# Patient Record
Sex: MALE | Race: BLACK OR AFRICAN AMERICAN | URBAN - METROPOLITAN AREA
[De-identification: names, ages, dates, MRNs, and addresses within clinical notes are randomized per-mention and may not be internally consistent; named-entity substitution may affect disease eponyms.]

---

## 2017-08-18 ENCOUNTER — IMPORTED ENCOUNTER (OUTPATIENT)
Dept: URBAN - METROPOLITAN AREA CLINIC 50 | Facility: CLINIC | Age: 47
End: 2017-08-18

## 2017-11-17 ENCOUNTER — IMPORTED ENCOUNTER (OUTPATIENT)
Dept: URBAN - METROPOLITAN AREA CLINIC 50 | Facility: CLINIC | Age: 47
End: 2017-11-17

## 2020-08-31 NOTE — PATIENT DISCUSSION
DISCUSSED WITH PT THAT IT HAS BEEN REPORTED THAT PATIENTS WITH HISTORY OF DEPRESSION OR MENTAL ILLNESS FOLLOWING ELECTIVE PROCEDURES, SUCH AS PLASTIC SURGERY OR LASER VISION CORRECTION CAN ADVERSELY EFFECT THEIR QUALITY OF LIFE, REGARDLESS WHETHER OUTCOME IS GOOD OR SUB-OPTIMAL PT ADVISED OF THE ALTERNATIVES OF STAYING WITH GLASSES AND/OR CONTACTS IN LIEU OF LASER VISION CORRECTION. PT. UNDERSTANDS THE RISK AND WISH TO PROCEED WITH PROCEDURE.  PT CURRENTLY HAS SERVICE ANIMAL FOR PREVIOUS ANXIETY/PTSD; LASIK SX PROCEDURE WAS THOROUGHLY DISCUSSED, PT STATES HE WOULD FEEL COMFORTABLE PROCEEDING (BRIGHT ALTERNATING LIGHTS IN CLOSE CONFINEMENT, ETC.)

## 2020-08-31 NOTE — PATIENT DISCUSSION
DM2 - DISCUSSED INCREASED RISK OF PROLONGED HEALING AND UNPREDICTABLE VISUAL OUTCOME. MEDICAL CLEARANCE CONFIRMING STABILITY PRIOR TO TREATMENT IS REQUIRED. PT UNDERSTANDS HE WILL BE DILATED PRIOR TO PROCEDURE TO RULE OUT DR WHICH MAY RULE OUT HIS CANDIDACY.

## 2020-08-31 NOTE — PATIENT DISCUSSION
MYOPIA OU - PT IS A GOOD CANDIDATE FOR LASIK OU. UNDERSTANDS HE WILL NEED READERS IMMEDIATELY AFTER THE PROCEDURE; INTERESTED IN DSV OU.

## 2020-08-31 NOTE — PATIENT DISCUSSION
[facility] UNDERSTANDS UNCONTROLLED DIABETES CAN LEAD TO EARLY ONSET CATARACTS WHICH MAY CHANGE HIS VISION S/P LASIK.  (-) VFL

## 2021-06-14 ASSESSMENT — TONOMETRY
OD_IOP_MMHG: 13
OS_IOP_MMHG: 13

## 2021-06-14 ASSESSMENT — VISUAL ACUITY
OD_BAT: 20/40-
OS_BAT: 20/50-
OS_SC: 20/25-3
OD_SC: 20/25-2
OS_CC: J5
OD_OTHER: 20/40-. 20/50-.
OS_OTHER: 20/50-. 20/60-.
OD_CC: J5